# Patient Record
Sex: FEMALE | Race: WHITE | NOT HISPANIC OR LATINO | Employment: STUDENT | URBAN - METROPOLITAN AREA
[De-identification: names, ages, dates, MRNs, and addresses within clinical notes are randomized per-mention and may not be internally consistent; named-entity substitution may affect disease eponyms.]

---

## 2023-07-21 ENCOUNTER — NURSE TRIAGE (OUTPATIENT)
Dept: NURSING | Facility: CLINIC | Age: 23
End: 2023-07-21

## 2023-07-21 NOTE — TELEPHONE ENCOUNTER
"Pt calling re: swollen lymph nodes.    Background:  Swelling in lymph nodes with allergies for the last year. Currently has swollen lymph nodes for the last week that feel like they are making it difficult to swallow. Hx of Hashiomoto's s/p Thyroidectomy.    Assessment:  Has a discomfort in her throat from the lymph node swelling. Feels like it's getting worse. Tried to swallow some pills and they got stuck in her throat. Describes swallowing difficulty as \"moderate.\" Has alos noticed a lot of fatigue over the last month. Denies difficulty breathing.     Disposition:  Protocol recommends see in office today. No PCP, so advised to go to /[facility]. Could try [facility] since she is a student.    Reviewed care advice. Advised pt to call back with any new or worsening symptoms. Patient verbalized understanding.    Albina Colunga RN, BSN  [facility]  Triage Nurse Advisor    Reason for Disposition    Swallowing difficulty and cause unknown  (Exception: Difficulty swallowing is a chronic symptom.)    Additional Information    Negative: SEVERE difficulty swallowing (e.g., drooling or spitting) and started suddenly after taking a medicine or allergic foods    Negative: Wheezing, stridor, hoarseness, or difficulty breathing    Negative: Swollen tongue and sudden onset    Negative: Sounds like a life-threatening emergency to the triager    Negative: Sore throat (throat pain with swallowing)    Negative: SEVERE difficulty swallowing (e.g., drooling or spitting, can't swallow water)    Negative: Symptoms of food or bone stuck in throat or esophagus (e.g., pain in throat or chest, FB sensation, blood-tinged saliva)    Negative: SEVERE symptoms of pill stuck in throat or esophagus (e.g., severe pain, bleeding, or difficulty swallowing liquids)    Negative: Drinking very little and dehydration suspected (e.g., no urine > 12 hours, very dry mouth, very lightheaded)    Negative: Refuses to drink anything for > 12 hours    " Negative: Patient sounds very sick or weak to the triager    Negative: Fever > 100.4 F (38.0 C)    Negative: Coughing spells occur during or within 2 hours after eating/feedings    Negative: Symptoms of pill stuck in throat or esophagus (e.g., pain in throat or chest, FB sensation) and no relief after using CARE ADVICE    Negative: Weak immune system (e.g., HIV positive, cancer chemo, splenectomy, organ transplant, chronic steroids)    Protocols used: SWALLOWING DIFFICULTY-A-OH

## 2023-09-19 ENCOUNTER — TRANSCRIBE ORDERS (OUTPATIENT)
Dept: OTHER | Age: 23
End: 2023-09-19

## 2023-09-19 DIAGNOSIS — L98.9 SKIN ABNORMALITY: Primary | ICD-10-CM

## 2023-10-22 ENCOUNTER — HEALTH MAINTENANCE LETTER (OUTPATIENT)
Age: 23
End: 2023-10-22

## 2024-12-15 ENCOUNTER — HEALTH MAINTENANCE LETTER (OUTPATIENT)
Age: 24
End: 2024-12-15

## 2024-12-20 ENCOUNTER — HOSPITAL ENCOUNTER (OUTPATIENT)
Dept: MRI IMAGING | Facility: CLINIC | Age: 24
Discharge: HOME | End: 2024-12-20
Attending: PHYSICIAN ASSISTANT | Admitting: PHYSICIAN ASSISTANT
Payer: COMMERCIAL

## 2024-12-20 DIAGNOSIS — M54.2 MUSCULOSKELETAL NECK PAIN: ICD-10-CM

## 2024-12-20 PROCEDURE — 72141 MRI NECK SPINE W/O DYE: CPT
